# Patient Record
Sex: MALE | Race: WHITE
[De-identification: names, ages, dates, MRNs, and addresses within clinical notes are randomized per-mention and may not be internally consistent; named-entity substitution may affect disease eponyms.]

---

## 2019-12-19 ENCOUNTER — HOSPITAL ENCOUNTER (OUTPATIENT)
Dept: HOSPITAL 19 - SDCO | Age: 79
Discharge: HOME | End: 2019-12-19
Attending: FAMILY MEDICINE
Payer: MEDICARE

## 2019-12-19 VITALS — HEART RATE: 68 BPM | DIASTOLIC BLOOD PRESSURE: 90 MMHG | SYSTOLIC BLOOD PRESSURE: 148 MMHG | TEMPERATURE: 97.8 F

## 2019-12-19 VITALS — HEIGHT: 70.98 IN | BODY MASS INDEX: 23.67 KG/M2 | WEIGHT: 169.09 LBS

## 2019-12-19 VITALS — TEMPERATURE: 97.2 F | SYSTOLIC BLOOD PRESSURE: 131 MMHG | HEART RATE: 68 BPM | DIASTOLIC BLOOD PRESSURE: 75 MMHG

## 2019-12-19 VITALS — DIASTOLIC BLOOD PRESSURE: 88 MMHG | SYSTOLIC BLOOD PRESSURE: 124 MMHG | HEART RATE: 80 BPM

## 2019-12-19 DIAGNOSIS — A69.20: ICD-10-CM

## 2019-12-19 DIAGNOSIS — I10: ICD-10-CM

## 2019-12-19 DIAGNOSIS — Z86.010: ICD-10-CM

## 2019-12-19 DIAGNOSIS — Z87.891: ICD-10-CM

## 2019-12-19 DIAGNOSIS — Z90.89: ICD-10-CM

## 2019-12-19 DIAGNOSIS — Z85.118: ICD-10-CM

## 2019-12-19 DIAGNOSIS — Z12.11: Primary | ICD-10-CM

## 2019-12-19 NOTE — NUR
RECEIVED COFFEE AND DRANK 100%
RECEIVED DISCHARGE INSTRUCTIONS AND VERBALIZED UNDERSTANDING.
DISCONTINUED IV AND INT- CATHETER INTACT

## 2019-12-19 NOTE — NUR
TO BAY 4 PER OWN WILL STEADY GAIT. ALERT ORIENTED X3,TALKING WITH STAFF AND
SON. AMBULATED TO RECLINER WITH ASSIST AND TOLERATED WELL.
DR SPEARS TALKED WITH SON PRIOR TO PATIENT RETURNING TO .

## 2020-10-13 ENCOUNTER — HOSPITAL ENCOUNTER (INPATIENT)
Dept: HOSPITAL 19 - COL.ER | Age: 80
LOS: 7 days | Discharge: HOME HEALTH | DRG: 242 | End: 2020-10-20
Attending: STUDENT IN AN ORGANIZED HEALTH CARE EDUCATION/TRAINING PROGRAM | Admitting: STUDENT IN AN ORGANIZED HEALTH CARE EDUCATION/TRAINING PROGRAM
Payer: MEDICARE

## 2020-10-13 VITALS — OXYGEN SATURATION: 95 %

## 2020-10-13 VITALS — OXYGEN SATURATION: 91 %

## 2020-10-13 VITALS — OXYGEN SATURATION: 96 %

## 2020-10-13 VITALS — OXYGEN SATURATION: 94 %

## 2020-10-13 VITALS — SYSTOLIC BLOOD PRESSURE: 95 MMHG | TEMPERATURE: 100.3 F | DIASTOLIC BLOOD PRESSURE: 52 MMHG | HEART RATE: 42 BPM

## 2020-10-13 VITALS — OXYGEN SATURATION: 92 %

## 2020-10-13 VITALS — OXYGEN SATURATION: 97 %

## 2020-10-13 VITALS — OXYGEN SATURATION: 90 %

## 2020-10-13 VITALS — OXYGEN SATURATION: 89 %

## 2020-10-13 VITALS — OXYGEN SATURATION: 98 %

## 2020-10-13 VITALS — OXYGEN SATURATION: 93 %

## 2020-10-13 VITALS — HEIGHT: 70 IN | WEIGHT: 173.94 LBS | BODY MASS INDEX: 24.9 KG/M2

## 2020-10-13 VITALS — OXYGEN SATURATION: 86 %

## 2020-10-13 VITALS — OXYGEN SATURATION: 100 %

## 2020-10-13 VITALS — OXYGEN SATURATION: 88 %

## 2020-10-13 VITALS — OXYGEN SATURATION: 99 %

## 2020-10-13 VITALS
DIASTOLIC BLOOD PRESSURE: 53 MMHG | HEART RATE: 67 BPM | OXYGEN SATURATION: 97 % | SYSTOLIC BLOOD PRESSURE: 123 MMHG | TEMPERATURE: 100.3 F

## 2020-10-13 VITALS — HEART RATE: 84 BPM | OXYGEN SATURATION: 95 % | DIASTOLIC BLOOD PRESSURE: 61 MMHG | SYSTOLIC BLOOD PRESSURE: 99 MMHG

## 2020-10-13 VITALS — OXYGEN SATURATION: 82 %

## 2020-10-13 VITALS
DIASTOLIC BLOOD PRESSURE: 70 MMHG | TEMPERATURE: 98.7 F | HEART RATE: 51 BPM | OXYGEN SATURATION: 94 % | SYSTOLIC BLOOD PRESSURE: 102 MMHG

## 2020-10-13 VITALS — OXYGEN SATURATION: 87 %

## 2020-10-13 VITALS — OXYGEN SATURATION: 78 %

## 2020-10-13 VITALS — OXYGEN SATURATION: 84 %

## 2020-10-13 DIAGNOSIS — I44.2: Primary | ICD-10-CM

## 2020-10-13 DIAGNOSIS — I21.4: ICD-10-CM

## 2020-10-13 DIAGNOSIS — J96.01: ICD-10-CM

## 2020-10-13 DIAGNOSIS — Z20.828: ICD-10-CM

## 2020-10-13 DIAGNOSIS — I25.10: ICD-10-CM

## 2020-10-13 DIAGNOSIS — Z87.891: ICD-10-CM

## 2020-10-13 DIAGNOSIS — I48.91: ICD-10-CM

## 2020-10-13 DIAGNOSIS — E78.5: ICD-10-CM

## 2020-10-13 DIAGNOSIS — N17.9: ICD-10-CM

## 2020-10-13 DIAGNOSIS — I95.9: ICD-10-CM

## 2020-10-13 DIAGNOSIS — I10: ICD-10-CM

## 2020-10-13 DIAGNOSIS — J18.9: ICD-10-CM

## 2020-10-13 DIAGNOSIS — F05: ICD-10-CM

## 2020-10-13 DIAGNOSIS — D69.6: ICD-10-CM

## 2020-10-13 LAB
ALBUMIN SERPL-MCNC: 4.4 GM/DL (ref 3.5–5)
ALP SERPL-CCNC: 87 U/L (ref 50–136)
ALT SERPL-CCNC: 62 U/L (ref 4–49)
ANION GAP SERPL CALC-SCNC: 11 MMOL/L (ref 7–16)
APTT PPP: 27.7 SECONDS (ref 26–37)
AST SERPL-CCNC: 57 U/L (ref 15–37)
BASOPHILS # BLD: 0 10*3/UL (ref 0–0.2)
BASOPHILS NFR BLD AUTO: 0.2 % (ref 0–2)
BILIRUB SERPL-MCNC: 0.8 MG/DL (ref 0–1)
BUN SERPL-MCNC: 47 MG/DL (ref 9–20)
CALCIUM SERPL-MCNC: 9.3 MG/DL (ref 8.4–10.2)
CHLORIDE SERPL-SCNC: 103 MMOL/L (ref 98–107)
CO2 SERPL-SCNC: 23 MMOL/L (ref 22–30)
CREAT SERPL-SCNC: 1.41 UMOL/L (ref 0.66–1.25)
EOSINOPHIL # BLD: 0.1 10*3/UL (ref 0–0.7)
EOSINOPHIL NFR BLD: 0.9 % (ref 0–4)
ERYTHROCYTE [DISTWIDTH] IN BLOOD BY AUTOMATED COUNT: 12.6 % (ref 11.5–14.5)
GLUCOSE SERPL-MCNC: 102 MG/DL (ref 74–106)
GRANULOCYTES # BLD AUTO: 73.9 % (ref 42.2–75.2)
HCT VFR BLD AUTO: 41.4 % (ref 42–52)
HGB BLD-MCNC: 14 G/DL (ref 13.5–18)
INR BLD: 1 (ref 0.8–3)
LYMPHOCYTES # BLD: 1.9 10*3/UL (ref 1.2–3.4)
LYMPHOCYTES NFR BLD: 13.7 % (ref 20–51)
MAGNESIUM SERPL-MCNC: 2.4 MG/DL (ref 1.6–2.3)
MCH RBC QN AUTO: 31 PG (ref 27–31)
MCHC RBC AUTO-ENTMCNC: 34 G/DL (ref 33–37)
MCV RBC AUTO: 93 FL (ref 80–100)
MONOCYTES # BLD: 1.4 10*3/UL (ref 0.1–0.6)
MONOCYTES NFR BLD AUTO: 10.7 % (ref 1.7–9.3)
NEUTROPHILS # BLD: 9.9 10*3/UL (ref 1.4–6.5)
PLATELET # BLD AUTO: 198 K/MM3 (ref 130–400)
PMV BLD AUTO: 11 FL (ref 7.4–10.4)
POTASSIUM SERPL-SCNC: 4.9 MMOL/L (ref 3.4–5)
PROT SERPL-MCNC: 7.3 GM/DL (ref 6.4–8.2)
PROTHROMBIN TIME: 10.9 SECONDS (ref 9.7–12.8)
RBC # BLD AUTO: 4.46 M/MM3 (ref 4.2–5.6)
SODIUM SERPL-SCNC: 137 MMOL/L (ref 137–145)
TROPONIN I SERPL-MCNC: 0.23 NG/ML (ref 0–0.04)

## 2020-10-13 PROCEDURE — C9600 PERC DRUG-EL COR STENT SING: HCPCS

## 2020-10-13 PROCEDURE — 5A1223Z PERFORMANCE OF CARDIAC PACING, CONTINUOUS: ICD-10-PCS | Performed by: INTERNAL MEDICINE

## 2020-10-13 PROCEDURE — G0378 HOSPITAL OBSERVATION PER HR: HCPCS

## 2020-10-14 VITALS — SYSTOLIC BLOOD PRESSURE: 109 MMHG | HEART RATE: 37 BPM | DIASTOLIC BLOOD PRESSURE: 69 MMHG

## 2020-10-14 VITALS — OXYGEN SATURATION: 93 %

## 2020-10-14 VITALS — OXYGEN SATURATION: 96 %

## 2020-10-14 VITALS — OXYGEN SATURATION: 91 %

## 2020-10-14 VITALS — HEART RATE: 73 BPM | SYSTOLIC BLOOD PRESSURE: 110 MMHG | OXYGEN SATURATION: 91 % | DIASTOLIC BLOOD PRESSURE: 90 MMHG

## 2020-10-14 VITALS — OXYGEN SATURATION: 85 %

## 2020-10-14 VITALS — OXYGEN SATURATION: 83 %

## 2020-10-14 VITALS — OXYGEN SATURATION: 90 %

## 2020-10-14 VITALS — OXYGEN SATURATION: 92 %

## 2020-10-14 VITALS — OXYGEN SATURATION: 88 %

## 2020-10-14 VITALS — OXYGEN SATURATION: 92 % | HEART RATE: 77 BPM | DIASTOLIC BLOOD PRESSURE: 66 MMHG | SYSTOLIC BLOOD PRESSURE: 147 MMHG

## 2020-10-14 VITALS — OXYGEN SATURATION: 89 %

## 2020-10-14 VITALS — OXYGEN SATURATION: 95 %

## 2020-10-14 VITALS — DIASTOLIC BLOOD PRESSURE: 68 MMHG | SYSTOLIC BLOOD PRESSURE: 138 MMHG | HEART RATE: 75 BPM

## 2020-10-14 VITALS — OXYGEN SATURATION: 93 % | DIASTOLIC BLOOD PRESSURE: 55 MMHG | SYSTOLIC BLOOD PRESSURE: 125 MMHG | HEART RATE: 71 BPM

## 2020-10-14 VITALS — OXYGEN SATURATION: 94 %

## 2020-10-14 VITALS — DIASTOLIC BLOOD PRESSURE: 57 MMHG | SYSTOLIC BLOOD PRESSURE: 133 MMHG | HEART RATE: 50 BPM

## 2020-10-14 VITALS — OXYGEN SATURATION: 87 %

## 2020-10-14 VITALS
OXYGEN SATURATION: 93 % | HEART RATE: 40 BPM | DIASTOLIC BLOOD PRESSURE: 56 MMHG | SYSTOLIC BLOOD PRESSURE: 141 MMHG | TEMPERATURE: 97.8 F

## 2020-10-14 VITALS
OXYGEN SATURATION: 93 % | DIASTOLIC BLOOD PRESSURE: 61 MMHG | TEMPERATURE: 97.9 F | SYSTOLIC BLOOD PRESSURE: 121 MMHG | HEART RATE: 69 BPM

## 2020-10-14 VITALS — OXYGEN SATURATION: 98 %

## 2020-10-14 VITALS — OXYGEN SATURATION: 97 %

## 2020-10-14 VITALS — OXYGEN SATURATION: 86 %

## 2020-10-14 VITALS — OXYGEN SATURATION: 94 % | SYSTOLIC BLOOD PRESSURE: 145 MMHG | HEART RATE: 62 BPM | DIASTOLIC BLOOD PRESSURE: 59 MMHG

## 2020-10-14 VITALS — SYSTOLIC BLOOD PRESSURE: 107 MMHG | TEMPERATURE: 98 F | DIASTOLIC BLOOD PRESSURE: 50 MMHG | HEART RATE: 38 BPM

## 2020-10-14 VITALS — HEART RATE: 79 BPM | DIASTOLIC BLOOD PRESSURE: 62 MMHG | OXYGEN SATURATION: 98 % | SYSTOLIC BLOOD PRESSURE: 119 MMHG

## 2020-10-14 VITALS
HEART RATE: 37 BPM | TEMPERATURE: 98.2 F | OXYGEN SATURATION: 91 % | DIASTOLIC BLOOD PRESSURE: 54 MMHG | SYSTOLIC BLOOD PRESSURE: 105 MMHG

## 2020-10-14 VITALS — OXYGEN SATURATION: 93 % | SYSTOLIC BLOOD PRESSURE: 135 MMHG | DIASTOLIC BLOOD PRESSURE: 52 MMHG | HEART RATE: 52 BPM

## 2020-10-14 VITALS
DIASTOLIC BLOOD PRESSURE: 81 MMHG | SYSTOLIC BLOOD PRESSURE: 143 MMHG | OXYGEN SATURATION: 95 % | TEMPERATURE: 98.3 F | HEART RATE: 70 BPM

## 2020-10-14 VITALS — HEART RATE: 40 BPM | DIASTOLIC BLOOD PRESSURE: 56 MMHG | SYSTOLIC BLOOD PRESSURE: 141 MMHG

## 2020-10-14 VITALS — OXYGEN SATURATION: 84 %

## 2020-10-14 VITALS — HEART RATE: 64 BPM | OXYGEN SATURATION: 97 % | SYSTOLIC BLOOD PRESSURE: 136 MMHG | DIASTOLIC BLOOD PRESSURE: 59 MMHG

## 2020-10-14 VITALS — OXYGEN SATURATION: 97 % | SYSTOLIC BLOOD PRESSURE: 119 MMHG | HEART RATE: 69 BPM | DIASTOLIC BLOOD PRESSURE: 56 MMHG

## 2020-10-14 VITALS — SYSTOLIC BLOOD PRESSURE: 145 MMHG | OXYGEN SATURATION: 95 % | HEART RATE: 49 BPM | DIASTOLIC BLOOD PRESSURE: 66 MMHG

## 2020-10-14 VITALS — SYSTOLIC BLOOD PRESSURE: 121 MMHG | HEART RATE: 69 BPM | DIASTOLIC BLOOD PRESSURE: 61 MMHG

## 2020-10-14 VITALS — OXYGEN SATURATION: 82 %

## 2020-10-14 VITALS — OXYGEN SATURATION: 99 %

## 2020-10-14 VITALS — OXYGEN SATURATION: 65 %

## 2020-10-14 VITALS — OXYGEN SATURATION: 79 %

## 2020-10-14 VITALS — OXYGEN SATURATION: 59 %

## 2020-10-14 VITALS — OXYGEN SATURATION: 78 %

## 2020-10-14 LAB
ALBUMIN SERPL-MCNC: 3.7 GM/DL (ref 3.5–5)
ALP SERPL-CCNC: 73 U/L (ref 50–136)
ALT SERPL-CCNC: 38 U/L (ref 4–49)
ANION GAP SERPL CALC-SCNC: 10 MMOL/L (ref 7–16)
AST SERPL-CCNC: 30 U/L (ref 15–37)
BILIRUB SERPL-MCNC: 0.9 MG/DL (ref 0–1)
BUN SERPL-MCNC: 40 MG/DL (ref 9–20)
CALCIUM SERPL-MCNC: 8 MG/DL (ref 8.4–10.2)
CHLORIDE SERPL-SCNC: 107 MMOL/L (ref 98–107)
CHOLEST SPEC-SCNC: 128 MG/DL (ref 120–200)
CHOLEST/HDLC SERPL-SRTO: 5.1
CO2 SERPL-SCNC: 19 MMOL/L (ref 22–30)
CREAT SERPL-SCNC: 1.34 UMOL/L (ref 0.66–1.25)
ERYTHROCYTE [DISTWIDTH] IN BLOOD BY AUTOMATED COUNT: 13 % (ref 11.5–14.5)
GLUCOSE SERPL-MCNC: 106 MG/DL (ref 74–106)
HCT VFR BLD AUTO: 40.5 % (ref 42–52)
HDLC SERPL-MCNC: 25 MG/DL
HGB BLD-MCNC: 13.5 G/DL (ref 13.5–18)
LDLC SERPL-MCNC: 75 MG/DL
LYMPHOCYTES NFR BLD MANUAL: 19 % (ref 20–51)
MCH RBC QN AUTO: 32 PG (ref 27–31)
MCHC RBC AUTO-ENTMCNC: 33 G/DL (ref 33–37)
MCV RBC AUTO: 94 FL (ref 80–100)
MONOCYTES NFR BLD: 11 % (ref 1.7–9.3)
NEUTS BAND NFR BLD: 2 % (ref 0–10)
NEUTS SEG NFR BLD MANUAL: 68 % (ref 42–75.2)
PLATELET # BLD AUTO: 145 K/MM3 (ref 130–400)
PLATELET BLD QL SMEAR: NORMAL
PMV BLD AUTO: 10.9 FL (ref 7.4–10.4)
POTASSIUM SERPL-SCNC: 4.6 MMOL/L (ref 3.4–5)
PROT SERPL-MCNC: 6.3 GM/DL (ref 6.4–8.2)
RBC # BLD AUTO: 4.29 M/MM3 (ref 4.2–5.6)
SODIUM SERPL-SCNC: 136 MMOL/L (ref 137–145)
TRIGL SERPL-MCNC: 138 MG/DL

## 2020-10-14 PROCEDURE — 4A023N7 MEASUREMENT OF CARDIAC SAMPLING AND PRESSURE, LEFT HEART, PERCUTANEOUS APPROACH: ICD-10-PCS | Performed by: INTERNAL MEDICINE

## 2020-10-14 PROCEDURE — B2111ZZ FLUOROSCOPY OF MULTIPLE CORONARY ARTERIES USING LOW OSMOLAR CONTRAST: ICD-10-PCS | Performed by: INTERNAL MEDICINE

## 2020-10-14 PROCEDURE — 027034Z DILATION OF CORONARY ARTERY, ONE ARTERY WITH DRUG-ELUTING INTRALUMINAL DEVICE, PERCUTANEOUS APPROACH: ICD-10-PCS | Performed by: INTERNAL MEDICINE

## 2020-10-15 VITALS — OXYGEN SATURATION: 89 %

## 2020-10-15 VITALS — OXYGEN SATURATION: 96 %

## 2020-10-15 VITALS — OXYGEN SATURATION: 95 %

## 2020-10-15 VITALS — OXYGEN SATURATION: 98 %

## 2020-10-15 VITALS — OXYGEN SATURATION: 97 %

## 2020-10-15 VITALS — OXYGEN SATURATION: 94 %

## 2020-10-15 VITALS — OXYGEN SATURATION: 93 %

## 2020-10-15 VITALS — OXYGEN SATURATION: 92 %

## 2020-10-15 VITALS — OXYGEN SATURATION: 88 %

## 2020-10-15 VITALS — OXYGEN SATURATION: 100 %

## 2020-10-15 VITALS — OXYGEN SATURATION: 91 %

## 2020-10-15 VITALS — OXYGEN SATURATION: 90 %

## 2020-10-15 VITALS — OXYGEN SATURATION: 99 %

## 2020-10-15 VITALS — HEART RATE: 46 BPM | SYSTOLIC BLOOD PRESSURE: 113 MMHG | DIASTOLIC BLOOD PRESSURE: 90 MMHG | TEMPERATURE: 98.1 F

## 2020-10-15 VITALS — OXYGEN SATURATION: 72 %

## 2020-10-15 VITALS — OXYGEN SATURATION: 79 %

## 2020-10-15 VITALS — OXYGEN SATURATION: 82 %

## 2020-10-15 VITALS
HEART RATE: 53 BPM | TEMPERATURE: 98.2 F | SYSTOLIC BLOOD PRESSURE: 122 MMHG | OXYGEN SATURATION: 96 % | DIASTOLIC BLOOD PRESSURE: 49 MMHG

## 2020-10-15 VITALS — OXYGEN SATURATION: 87 %

## 2020-10-15 VITALS — OXYGEN SATURATION: 85 %

## 2020-10-15 VITALS
DIASTOLIC BLOOD PRESSURE: 47 MMHG | SYSTOLIC BLOOD PRESSURE: 145 MMHG | OXYGEN SATURATION: 96 % | HEART RATE: 58 BPM | TEMPERATURE: 98 F

## 2020-10-15 VITALS — SYSTOLIC BLOOD PRESSURE: 122 MMHG | HEART RATE: 46 BPM | TEMPERATURE: 98.3 F | DIASTOLIC BLOOD PRESSURE: 71 MMHG

## 2020-10-15 VITALS — HEART RATE: 51 BPM | TEMPERATURE: 98.4 F | DIASTOLIC BLOOD PRESSURE: 62 MMHG | SYSTOLIC BLOOD PRESSURE: 130 MMHG

## 2020-10-15 VITALS — DIASTOLIC BLOOD PRESSURE: 49 MMHG | SYSTOLIC BLOOD PRESSURE: 121 MMHG | HEART RATE: 46 BPM | TEMPERATURE: 98.4 F

## 2020-10-15 VITALS — OXYGEN SATURATION: 86 %

## 2020-10-15 VITALS — OXYGEN SATURATION: 68 %

## 2020-10-15 VITALS — OXYGEN SATURATION: 71 %

## 2020-10-15 LAB
ANION GAP SERPL CALC-SCNC: 9 MMOL/L (ref 7–16)
BUN SERPL-MCNC: 30 MG/DL (ref 9–20)
CALCIUM SERPL-MCNC: 8 MG/DL (ref 8.4–10.2)
CHLORIDE SERPL-SCNC: 109 MMOL/L (ref 98–107)
CO2 SERPL-SCNC: 18 MMOL/L (ref 22–30)
CREAT SERPL-SCNC: 1.18 UMOL/L (ref 0.66–1.25)
ERYTHROCYTE [DISTWIDTH] IN BLOOD BY AUTOMATED COUNT: 12.7 % (ref 11.5–14.5)
GLUCOSE SERPL-MCNC: 124 MG/DL (ref 74–106)
HCT VFR BLD AUTO: 35.9 % (ref 42–52)
HGB BLD-MCNC: 12.6 G/DL (ref 13.5–18)
LYMPHOCYTES NFR BLD MANUAL: 9 % (ref 20–51)
MCH RBC QN AUTO: 32 PG (ref 27–31)
MCHC RBC AUTO-ENTMCNC: 35 G/DL (ref 33–37)
MCV RBC AUTO: 90 FL (ref 80–100)
MONOCYTES NFR BLD: 17 % (ref 1.7–9.3)
NEUTS BAND NFR BLD: 4 % (ref 0–10)
NEUTS SEG NFR BLD MANUAL: 70 % (ref 42–75.2)
PLATELET # BLD AUTO: 111 K/MM3 (ref 130–400)
PLATELET BLD QL SMEAR: (no result)
PMV BLD AUTO: 11 FL (ref 7.4–10.4)
POTASSIUM SERPL-SCNC: 4.1 MMOL/L (ref 3.4–5)
RBC # BLD AUTO: 4 M/MM3 (ref 4.2–5.6)
SODIUM SERPL-SCNC: 136 MMOL/L (ref 137–145)

## 2020-10-16 VITALS — OXYGEN SATURATION: 97 %

## 2020-10-16 VITALS — OXYGEN SATURATION: 95 %

## 2020-10-16 VITALS — OXYGEN SATURATION: 92 %

## 2020-10-16 VITALS — OXYGEN SATURATION: 96 %

## 2020-10-16 VITALS — OXYGEN SATURATION: 93 %

## 2020-10-16 VITALS — OXYGEN SATURATION: 98 %

## 2020-10-16 VITALS — OXYGEN SATURATION: 94 %

## 2020-10-16 VITALS — OXYGEN SATURATION: 99 %

## 2020-10-16 VITALS
HEART RATE: 48 BPM | OXYGEN SATURATION: 95 % | TEMPERATURE: 98 F | SYSTOLIC BLOOD PRESSURE: 134 MMHG | DIASTOLIC BLOOD PRESSURE: 49 MMHG

## 2020-10-16 VITALS — OXYGEN SATURATION: 87 %

## 2020-10-16 VITALS — OXYGEN SATURATION: 85 %

## 2020-10-16 VITALS
DIASTOLIC BLOOD PRESSURE: 66 MMHG | HEART RATE: 48 BPM | OXYGEN SATURATION: 96 % | SYSTOLIC BLOOD PRESSURE: 138 MMHG | TEMPERATURE: 97.8 F

## 2020-10-16 VITALS
HEART RATE: 49 BPM | SYSTOLIC BLOOD PRESSURE: 125 MMHG | OXYGEN SATURATION: 94 % | DIASTOLIC BLOOD PRESSURE: 52 MMHG | TEMPERATURE: 97.8 F

## 2020-10-16 VITALS — HEART RATE: 55 BPM | SYSTOLIC BLOOD PRESSURE: 144 MMHG | DIASTOLIC BLOOD PRESSURE: 84 MMHG

## 2020-10-16 VITALS — OXYGEN SATURATION: 89 %

## 2020-10-16 VITALS — OXYGEN SATURATION: 91 %

## 2020-10-16 VITALS — OXYGEN SATURATION: 100 %

## 2020-10-16 VITALS — OXYGEN SATURATION: 90 %

## 2020-10-16 VITALS
OXYGEN SATURATION: 94 % | TEMPERATURE: 98.1 F | SYSTOLIC BLOOD PRESSURE: 118 MMHG | DIASTOLIC BLOOD PRESSURE: 53 MMHG | HEART RATE: 61 BPM

## 2020-10-16 VITALS — DIASTOLIC BLOOD PRESSURE: 63 MMHG | HEART RATE: 56 BPM | SYSTOLIC BLOOD PRESSURE: 111 MMHG | OXYGEN SATURATION: 99 %

## 2020-10-16 VITALS — OXYGEN SATURATION: 86 %

## 2020-10-16 VITALS — OXYGEN SATURATION: 88 %

## 2020-10-16 VITALS
TEMPERATURE: 98 F | OXYGEN SATURATION: 97 % | HEART RATE: 55 BPM | SYSTOLIC BLOOD PRESSURE: 144 MMHG | DIASTOLIC BLOOD PRESSURE: 84 MMHG

## 2020-10-16 VITALS
OXYGEN SATURATION: 96 % | TEMPERATURE: 98 F | DIASTOLIC BLOOD PRESSURE: 61 MMHG | HEART RATE: 53 BPM | SYSTOLIC BLOOD PRESSURE: 128 MMHG

## 2020-10-16 VITALS
OXYGEN SATURATION: 97 % | SYSTOLIC BLOOD PRESSURE: 144 MMHG | DIASTOLIC BLOOD PRESSURE: 55 MMHG | HEART RATE: 52 BPM | TEMPERATURE: 99.4 F

## 2020-10-16 VITALS — HEART RATE: 55 BPM | SYSTOLIC BLOOD PRESSURE: 144 MMHG | OXYGEN SATURATION: 96 % | DIASTOLIC BLOOD PRESSURE: 84 MMHG

## 2020-10-16 VITALS
TEMPERATURE: 99.4 F | DIASTOLIC BLOOD PRESSURE: 55 MMHG | OXYGEN SATURATION: 95 % | SYSTOLIC BLOOD PRESSURE: 144 MMHG | HEART RATE: 70 BPM

## 2020-10-16 VITALS — OXYGEN SATURATION: 78 %

## 2020-10-16 LAB
ANION GAP SERPL CALC-SCNC: 7 MMOL/L (ref 7–16)
BASOPHILS # BLD: 0 10*3/UL (ref 0–0.2)
BASOPHILS NFR BLD AUTO: 0.2 % (ref 0–2)
BUN SERPL-MCNC: 32 MG/DL (ref 9–20)
CALCIUM SERPL-MCNC: 8 MG/DL (ref 8.4–10.2)
CHLORIDE SERPL-SCNC: 107 MMOL/L (ref 98–107)
CO2 SERPL-SCNC: 21 MMOL/L (ref 22–30)
CREAT SERPL-SCNC: 1.14 UMOL/L (ref 0.66–1.25)
EOSINOPHIL # BLD: 0 10*3/UL (ref 0–0.7)
EOSINOPHIL NFR BLD: 0.2 % (ref 0–4)
ERYTHROCYTE [DISTWIDTH] IN BLOOD BY AUTOMATED COUNT: 12.8 % (ref 11.5–14.5)
GLUCOSE SERPL-MCNC: 115 MG/DL (ref 74–106)
GRANULOCYTES # BLD AUTO: 81.2 % (ref 42.2–75.2)
HCT VFR BLD AUTO: 33.3 % (ref 42–52)
HGB BLD-MCNC: 11.4 G/DL (ref 13.5–18)
LYMPHOCYTES # BLD: 0.9 10*3/UL (ref 1.2–3.4)
LYMPHOCYTES NFR BLD: 7 % (ref 20–51)
MAGNESIUM SERPL-MCNC: 2.3 MG/DL (ref 1.6–2.3)
MCH RBC QN AUTO: 31 PG (ref 27–31)
MCHC RBC AUTO-ENTMCNC: 34 G/DL (ref 33–37)
MCV RBC AUTO: 91 FL (ref 80–100)
MONOCYTES # BLD: 1.3 10*3/UL (ref 0.1–0.6)
MONOCYTES NFR BLD AUTO: 10.8 % (ref 1.7–9.3)
NEUTROPHILS # BLD: 9.9 10*3/UL (ref 1.4–6.5)
PLATELET # BLD AUTO: 136 K/MM3 (ref 130–400)
PMV BLD AUTO: 11.3 FL (ref 7.4–10.4)
POTASSIUM SERPL-SCNC: 3.6 MMOL/L (ref 3.4–5)
RBC # BLD AUTO: 3.67 M/MM3 (ref 4.2–5.6)
SODIUM SERPL-SCNC: 135 MMOL/L (ref 137–145)

## 2020-10-16 PROCEDURE — 5A2204Z RESTORATION OF CARDIAC RHYTHM, SINGLE: ICD-10-PCS | Performed by: INTERNAL MEDICINE

## 2020-10-17 VITALS — OXYGEN SATURATION: 96 %

## 2020-10-17 VITALS
OXYGEN SATURATION: 95 % | SYSTOLIC BLOOD PRESSURE: 135 MMHG | TEMPERATURE: 98.2 F | DIASTOLIC BLOOD PRESSURE: 51 MMHG | HEART RATE: 49 BPM

## 2020-10-17 VITALS — OXYGEN SATURATION: 94 %

## 2020-10-17 VITALS — OXYGEN SATURATION: 76 %

## 2020-10-17 VITALS — OXYGEN SATURATION: 93 %

## 2020-10-17 VITALS — OXYGEN SATURATION: 97 %

## 2020-10-17 VITALS — OXYGEN SATURATION: 91 %

## 2020-10-17 VITALS — OXYGEN SATURATION: 95 %

## 2020-10-17 VITALS — OXYGEN SATURATION: 99 %

## 2020-10-17 VITALS — OXYGEN SATURATION: 92 %

## 2020-10-17 VITALS — OXYGEN SATURATION: 83 %

## 2020-10-17 VITALS — OXYGEN SATURATION: 98 %

## 2020-10-17 VITALS — OXYGEN SATURATION: 78 %

## 2020-10-17 VITALS
SYSTOLIC BLOOD PRESSURE: 118 MMHG | TEMPERATURE: 98.1 F | DIASTOLIC BLOOD PRESSURE: 81 MMHG | OXYGEN SATURATION: 84 % | HEART RATE: 62 BPM

## 2020-10-17 VITALS — OXYGEN SATURATION: 80 %

## 2020-10-17 VITALS — OXYGEN SATURATION: 88 %

## 2020-10-17 VITALS — OXYGEN SATURATION: 82 %

## 2020-10-17 VITALS — HEART RATE: 54 BPM | SYSTOLIC BLOOD PRESSURE: 126 MMHG | TEMPERATURE: 98.4 F | DIASTOLIC BLOOD PRESSURE: 75 MMHG

## 2020-10-17 VITALS — OXYGEN SATURATION: 81 %

## 2020-10-17 VITALS — OXYGEN SATURATION: 90 %

## 2020-10-17 VITALS — TEMPERATURE: 98.3 F | SYSTOLIC BLOOD PRESSURE: 140 MMHG | DIASTOLIC BLOOD PRESSURE: 74 MMHG | HEART RATE: 59 BPM

## 2020-10-17 VITALS — OXYGEN SATURATION: 87 %

## 2020-10-17 VITALS — OXYGEN SATURATION: 100 %

## 2020-10-17 VITALS
HEART RATE: 48 BPM | DIASTOLIC BLOOD PRESSURE: 90 MMHG | TEMPERATURE: 99.4 F | OXYGEN SATURATION: 94 % | SYSTOLIC BLOOD PRESSURE: 126 MMHG

## 2020-10-17 VITALS — OXYGEN SATURATION: 85 %

## 2020-10-17 VITALS
TEMPERATURE: 98.2 F | SYSTOLIC BLOOD PRESSURE: 119 MMHG | DIASTOLIC BLOOD PRESSURE: 79 MMHG | OXYGEN SATURATION: 80 % | HEART RATE: 60 BPM

## 2020-10-17 VITALS — OXYGEN SATURATION: 89 %

## 2020-10-17 VITALS — OXYGEN SATURATION: 84 %

## 2020-10-17 VITALS — OXYGEN SATURATION: 86 %

## 2020-10-17 VITALS — OXYGEN SATURATION: 72 %

## 2020-10-17 VITALS — OXYGEN SATURATION: 79 %

## 2020-10-17 VITALS — OXYGEN SATURATION: 77 %

## 2020-10-17 VITALS — OXYGEN SATURATION: 70 %

## 2020-10-17 LAB
ANION GAP SERPL CALC-SCNC: 8 MMOL/L (ref 7–16)
BASOPHILS # BLD: 0 10*3/UL (ref 0–0.2)
BASOPHILS NFR BLD AUTO: 0.2 % (ref 0–2)
BUN SERPL-MCNC: 29 MG/DL (ref 9–20)
CALCIUM SERPL-MCNC: 7.9 MG/DL (ref 8.4–10.2)
CHLORIDE SERPL-SCNC: 104 MMOL/L (ref 98–107)
CO2 SERPL-SCNC: 23 MMOL/L (ref 22–30)
CREAT SERPL-SCNC: 1.1 UMOL/L (ref 0.66–1.25)
EOSINOPHIL # BLD: 0.1 10*3/UL (ref 0–0.7)
EOSINOPHIL NFR BLD: 1.3 % (ref 0–4)
ERYTHROCYTE [DISTWIDTH] IN BLOOD BY AUTOMATED COUNT: 12.8 % (ref 11.5–14.5)
GLUCOSE SERPL-MCNC: 108 MG/DL (ref 74–106)
GRANULOCYTES # BLD AUTO: 78.8 % (ref 42.2–75.2)
HCT VFR BLD AUTO: 34.5 % (ref 42–52)
HGB BLD-MCNC: 11.9 G/DL (ref 13.5–18)
LYMPHOCYTES # BLD: 0.9 10*3/UL (ref 1.2–3.4)
LYMPHOCYTES NFR BLD: 9.5 % (ref 20–51)
MCH RBC QN AUTO: 31 PG (ref 27–31)
MCHC RBC AUTO-ENTMCNC: 35 G/DL (ref 33–37)
MCV RBC AUTO: 91 FL (ref 80–100)
MONOCYTES # BLD: 0.9 10*3/UL (ref 0.1–0.6)
MONOCYTES NFR BLD AUTO: 9.6 % (ref 1.7–9.3)
NEUTROPHILS # BLD: 7.7 10*3/UL (ref 1.4–6.5)
PLATELET # BLD AUTO: 153 K/MM3 (ref 130–400)
PMV BLD AUTO: 10.8 FL (ref 7.4–10.4)
POTASSIUM SERPL-SCNC: 3.9 MMOL/L (ref 3.4–5)
RBC # BLD AUTO: 3.8 M/MM3 (ref 4.2–5.6)
SODIUM SERPL-SCNC: 135 MMOL/L (ref 137–145)

## 2020-10-18 VITALS — OXYGEN SATURATION: 97 %

## 2020-10-18 VITALS — OXYGEN SATURATION: 95 %

## 2020-10-18 VITALS — OXYGEN SATURATION: 94 %

## 2020-10-18 VITALS — OXYGEN SATURATION: 93 %

## 2020-10-18 VITALS — OXYGEN SATURATION: 92 %

## 2020-10-18 VITALS — OXYGEN SATURATION: 91 %

## 2020-10-18 VITALS — HEART RATE: 53 BPM | TEMPERATURE: 98 F | SYSTOLIC BLOOD PRESSURE: 147 MMHG | DIASTOLIC BLOOD PRESSURE: 60 MMHG

## 2020-10-18 VITALS — OXYGEN SATURATION: 96 %

## 2020-10-18 VITALS — OXYGEN SATURATION: 90 %

## 2020-10-18 VITALS
HEART RATE: 98 BPM | SYSTOLIC BLOOD PRESSURE: 110 MMHG | TEMPERATURE: 98.2 F | DIASTOLIC BLOOD PRESSURE: 49 MMHG | OXYGEN SATURATION: 94 %

## 2020-10-18 VITALS — HEART RATE: 64 BPM | SYSTOLIC BLOOD PRESSURE: 110 MMHG | DIASTOLIC BLOOD PRESSURE: 41 MMHG | TEMPERATURE: 98.5 F

## 2020-10-18 VITALS — OXYGEN SATURATION: 84 %

## 2020-10-18 VITALS — OXYGEN SATURATION: 98 %

## 2020-10-18 VITALS
TEMPERATURE: 98 F | HEART RATE: 73 BPM | DIASTOLIC BLOOD PRESSURE: 86 MMHG | OXYGEN SATURATION: 95 % | SYSTOLIC BLOOD PRESSURE: 141 MMHG

## 2020-10-18 VITALS — OXYGEN SATURATION: 89 %

## 2020-10-18 VITALS — DIASTOLIC BLOOD PRESSURE: 47 MMHG | SYSTOLIC BLOOD PRESSURE: 119 MMHG | TEMPERATURE: 98.2 F | HEART RATE: 40 BPM

## 2020-10-18 VITALS
TEMPERATURE: 98.1 F | OXYGEN SATURATION: 93 % | DIASTOLIC BLOOD PRESSURE: 77 MMHG | HEART RATE: 43 BPM | SYSTOLIC BLOOD PRESSURE: 116 MMHG

## 2020-10-18 VITALS — OXYGEN SATURATION: 83 %

## 2020-10-18 VITALS — OXYGEN SATURATION: 87 %

## 2020-10-18 VITALS — OXYGEN SATURATION: 88 %

## 2020-10-18 VITALS — OXYGEN SATURATION: 74 %

## 2020-10-18 VITALS — OXYGEN SATURATION: 82 %

## 2020-10-18 VITALS — OXYGEN SATURATION: 86 %

## 2020-10-18 LAB
ANION GAP SERPL CALC-SCNC: 7 MMOL/L (ref 7–16)
ANION GAP SERPL CALC-SCNC: 8 MMOL/L (ref 7–16)
APTT PPP: 27.5 SECONDS (ref 26–37)
BASOPHILS # BLD: 0 10*3/UL (ref 0–0.2)
BASOPHILS NFR BLD AUTO: 0.3 % (ref 0–2)
BUN SERPL-MCNC: 29 MG/DL (ref 9–20)
BUN SERPL-MCNC: 31 MG/DL (ref 9–20)
CALCIUM SERPL-MCNC: 7.9 MG/DL (ref 8.4–10.2)
CALCIUM SERPL-MCNC: 8.5 MG/DL (ref 8.4–10.2)
CHLORIDE SERPL-SCNC: 101 MMOL/L (ref 98–107)
CHLORIDE SERPL-SCNC: 104 MMOL/L (ref 98–107)
CO2 SERPL-SCNC: 22 MMOL/L (ref 22–30)
CO2 SERPL-SCNC: 27 MMOL/L (ref 22–30)
CREAT SERPL-SCNC: 0.96 UMOL/L (ref 0.66–1.25)
CREAT SERPL-SCNC: 1.06 UMOL/L (ref 0.66–1.25)
EOSINOPHIL # BLD: 0.2 10*3/UL (ref 0–0.7)
EOSINOPHIL NFR BLD: 2.1 % (ref 0–4)
ERYTHROCYTE [DISTWIDTH] IN BLOOD BY AUTOMATED COUNT: 12.4 % (ref 11.5–14.5)
ERYTHROCYTE [DISTWIDTH] IN BLOOD BY AUTOMATED COUNT: 12.4 % (ref 11.5–14.5)
GLUCOSE SERPL-MCNC: 104 MG/DL (ref 74–106)
GLUCOSE SERPL-MCNC: 137 MG/DL (ref 74–106)
GRANULOCYTES # BLD AUTO: 79.4 % (ref 42.2–75.2)
HCT VFR BLD AUTO: 33.6 % (ref 42–52)
HCT VFR BLD AUTO: 36.1 % (ref 42–52)
HGB BLD-MCNC: 11.5 G/DL (ref 13.5–18)
HGB BLD-MCNC: 12.1 G/DL (ref 13.5–18)
INR BLD: 1.1 (ref 0.8–3)
LYMPHOCYTES # BLD: 0.8 10*3/UL (ref 1.2–3.4)
LYMPHOCYTES NFR BLD: 8.2 % (ref 20–51)
MCH RBC QN AUTO: 31 PG (ref 27–31)
MCH RBC QN AUTO: 31 PG (ref 27–31)
MCHC RBC AUTO-ENTMCNC: 34 G/DL (ref 33–37)
MCHC RBC AUTO-ENTMCNC: 34 G/DL (ref 33–37)
MCV RBC AUTO: 91 FL (ref 80–100)
MCV RBC AUTO: 91 FL (ref 80–100)
MONOCYTES # BLD: 0.9 10*3/UL (ref 0.1–0.6)
MONOCYTES NFR BLD AUTO: 9.6 % (ref 1.7–9.3)
NEUTROPHILS # BLD: 7.5 10*3/UL (ref 1.4–6.5)
PLATELET # BLD AUTO: 180 K/MM3 (ref 130–400)
PLATELET # BLD AUTO: 213 K/MM3 (ref 130–400)
PMV BLD AUTO: 10.2 FL (ref 7.4–10.4)
PMV BLD AUTO: 10.5 FL (ref 7.4–10.4)
POTASSIUM SERPL-SCNC: 3.4 MMOL/L (ref 3.4–5)
POTASSIUM SERPL-SCNC: 4 MMOL/L (ref 3.4–5)
PROTHROMBIN TIME: 12.6 SECONDS (ref 9.7–12.8)
RBC # BLD AUTO: 3.71 M/MM3 (ref 4.2–5.6)
RBC # BLD AUTO: 3.96 M/MM3 (ref 4.2–5.6)
SODIUM SERPL-SCNC: 135 MMOL/L (ref 137–145)
SODIUM SERPL-SCNC: 135 MMOL/L (ref 137–145)

## 2020-10-19 VITALS
HEART RATE: 58 BPM | SYSTOLIC BLOOD PRESSURE: 108 MMHG | OXYGEN SATURATION: 94 % | TEMPERATURE: 98.3 F | DIASTOLIC BLOOD PRESSURE: 45 MMHG

## 2020-10-19 VITALS — OXYGEN SATURATION: 97 %

## 2020-10-19 VITALS — OXYGEN SATURATION: 93 %

## 2020-10-19 VITALS — OXYGEN SATURATION: 95 %

## 2020-10-19 VITALS — OXYGEN SATURATION: 94 %

## 2020-10-19 VITALS
HEART RATE: 48 BPM | DIASTOLIC BLOOD PRESSURE: 55 MMHG | OXYGEN SATURATION: 97 % | TEMPERATURE: 98 F | SYSTOLIC BLOOD PRESSURE: 130 MMHG

## 2020-10-19 VITALS — OXYGEN SATURATION: 96 %

## 2020-10-19 VITALS
OXYGEN SATURATION: 93 % | HEART RATE: 42 BPM | SYSTOLIC BLOOD PRESSURE: 138 MMHG | DIASTOLIC BLOOD PRESSURE: 99 MMHG | TEMPERATURE: 98.3 F

## 2020-10-19 VITALS — OXYGEN SATURATION: 92 %

## 2020-10-19 VITALS — OXYGEN SATURATION: 98 %

## 2020-10-19 VITALS — OXYGEN SATURATION: 91 %

## 2020-10-19 VITALS — OXYGEN SATURATION: 99 %

## 2020-10-19 VITALS — OXYGEN SATURATION: 86 %

## 2020-10-19 VITALS — SYSTOLIC BLOOD PRESSURE: 116 MMHG | OXYGEN SATURATION: 96 % | DIASTOLIC BLOOD PRESSURE: 100 MMHG | HEART RATE: 42 BPM

## 2020-10-19 VITALS — OXYGEN SATURATION: 82 %

## 2020-10-19 VITALS — OXYGEN SATURATION: 89 %

## 2020-10-19 VITALS
DIASTOLIC BLOOD PRESSURE: 51 MMHG | OXYGEN SATURATION: 95 % | SYSTOLIC BLOOD PRESSURE: 135 MMHG | HEART RATE: 59 BPM | TEMPERATURE: 98.2 F

## 2020-10-19 VITALS — OXYGEN SATURATION: 90 %

## 2020-10-19 VITALS — SYSTOLIC BLOOD PRESSURE: 131 MMHG | DIASTOLIC BLOOD PRESSURE: 40 MMHG | HEART RATE: 45 BPM

## 2020-10-19 VITALS
DIASTOLIC BLOOD PRESSURE: 45 MMHG | SYSTOLIC BLOOD PRESSURE: 146 MMHG | TEMPERATURE: 97.6 F | HEART RATE: 61 BPM | OXYGEN SATURATION: 97 %

## 2020-10-19 VITALS — SYSTOLIC BLOOD PRESSURE: 108 MMHG | TEMPERATURE: 98.2 F | HEART RATE: 38 BPM | DIASTOLIC BLOOD PRESSURE: 56 MMHG

## 2020-10-19 VITALS
SYSTOLIC BLOOD PRESSURE: 127 MMHG | TEMPERATURE: 98.4 F | DIASTOLIC BLOOD PRESSURE: 60 MMHG | HEART RATE: 51 BPM | OXYGEN SATURATION: 95 %

## 2020-10-19 VITALS — OXYGEN SATURATION: 100 %

## 2020-10-19 VITALS — SYSTOLIC BLOOD PRESSURE: 130 MMHG | OXYGEN SATURATION: 98 % | HEART RATE: 54 BPM | DIASTOLIC BLOOD PRESSURE: 50 MMHG

## 2020-10-19 VITALS — HEART RATE: 53 BPM | SYSTOLIC BLOOD PRESSURE: 95 MMHG | DIASTOLIC BLOOD PRESSURE: 75 MMHG

## 2020-10-19 VITALS — OXYGEN SATURATION: 96 % | SYSTOLIC BLOOD PRESSURE: 134 MMHG | HEART RATE: 53 BPM | DIASTOLIC BLOOD PRESSURE: 55 MMHG

## 2020-10-19 VITALS — OXYGEN SATURATION: 87 %

## 2020-10-19 VITALS — OXYGEN SATURATION: 79 %

## 2020-10-19 VITALS — OXYGEN SATURATION: 80 %

## 2020-10-19 VITALS — OXYGEN SATURATION: 88 %

## 2020-10-19 VITALS — HEART RATE: 60 BPM | DIASTOLIC BLOOD PRESSURE: 48 MMHG | SYSTOLIC BLOOD PRESSURE: 120 MMHG

## 2020-10-19 VITALS — HEART RATE: 48 BPM | SYSTOLIC BLOOD PRESSURE: 130 MMHG | TEMPERATURE: 98 F | DIASTOLIC BLOOD PRESSURE: 55 MMHG

## 2020-10-19 VITALS — OXYGEN SATURATION: 92 % | DIASTOLIC BLOOD PRESSURE: 79 MMHG | HEART RATE: 54 BPM | SYSTOLIC BLOOD PRESSURE: 105 MMHG

## 2020-10-19 VITALS — OXYGEN SATURATION: 83 %

## 2020-10-19 VITALS — OXYGEN SATURATION: 76 %

## 2020-10-19 VITALS — OXYGEN SATURATION: 78 %

## 2020-10-19 VITALS — OXYGEN SATURATION: 85 %

## 2020-10-19 VITALS — OXYGEN SATURATION: 81 %

## 2020-10-19 LAB
ANION GAP SERPL CALC-SCNC: 9 MMOL/L (ref 7–16)
BASOPHILS # BLD: 0 10*3/UL (ref 0–0.2)
BASOPHILS NFR BLD AUTO: 0.2 % (ref 0–2)
BUN SERPL-MCNC: 32 MG/DL (ref 9–20)
CALCIUM SERPL-MCNC: 8.2 MG/DL (ref 8.4–10.2)
CHLORIDE SERPL-SCNC: 104 MMOL/L (ref 98–107)
CO2 SERPL-SCNC: 22 MMOL/L (ref 22–30)
CREAT SERPL-SCNC: 1.01 UMOL/L (ref 0.66–1.25)
EOSINOPHIL # BLD: 0.2 10*3/UL (ref 0–0.7)
EOSINOPHIL NFR BLD: 2.6 % (ref 0–4)
ERYTHROCYTE [DISTWIDTH] IN BLOOD BY AUTOMATED COUNT: 12.5 % (ref 11.5–14.5)
GLUCOSE SERPL-MCNC: 99 MG/DL (ref 74–106)
GRANULOCYTES # BLD AUTO: 76.1 % (ref 42.2–75.2)
HCT VFR BLD AUTO: 32.6 % (ref 42–52)
HGB BLD-MCNC: 11.2 G/DL (ref 13.5–18)
LYMPHOCYTES # BLD: 0.9 10*3/UL (ref 1.2–3.4)
LYMPHOCYTES NFR BLD: 9.6 % (ref 20–51)
MCH RBC QN AUTO: 32 PG (ref 27–31)
MCHC RBC AUTO-ENTMCNC: 34 G/DL (ref 33–37)
MCV RBC AUTO: 93 FL (ref 80–100)
MONOCYTES # BLD: 1 10*3/UL (ref 0.1–0.6)
MONOCYTES NFR BLD AUTO: 11 % (ref 1.7–9.3)
NEUTROPHILS # BLD: 7 10*3/UL (ref 1.4–6.5)
PLATELET # BLD AUTO: 196 K/MM3 (ref 130–400)
PMV BLD AUTO: 10.5 FL (ref 7.4–10.4)
POTASSIUM SERPL-SCNC: 4.1 MMOL/L (ref 3.4–5)
RBC # BLD AUTO: 3.52 M/MM3 (ref 4.2–5.6)
SODIUM SERPL-SCNC: 135 MMOL/L (ref 137–145)

## 2020-10-20 VITALS — OXYGEN SATURATION: 94 %

## 2020-10-20 VITALS — HEART RATE: 73 BPM | SYSTOLIC BLOOD PRESSURE: 135 MMHG | DIASTOLIC BLOOD PRESSURE: 60 MMHG

## 2020-10-20 VITALS — HEART RATE: 73 BPM | DIASTOLIC BLOOD PRESSURE: 80 MMHG | SYSTOLIC BLOOD PRESSURE: 146 MMHG

## 2020-10-20 VITALS — SYSTOLIC BLOOD PRESSURE: 124 MMHG | HEART RATE: 59 BPM | TEMPERATURE: 98.6 F | DIASTOLIC BLOOD PRESSURE: 50 MMHG

## 2020-10-20 VITALS — SYSTOLIC BLOOD PRESSURE: 115 MMHG | HEART RATE: 78 BPM | DIASTOLIC BLOOD PRESSURE: 57 MMHG

## 2020-10-20 VITALS — OXYGEN SATURATION: 93 %

## 2020-10-20 VITALS — OXYGEN SATURATION: 78 %

## 2020-10-20 VITALS — HEART RATE: 59 BPM | DIASTOLIC BLOOD PRESSURE: 57 MMHG | OXYGEN SATURATION: 94 % | SYSTOLIC BLOOD PRESSURE: 110 MMHG

## 2020-10-20 VITALS — OXYGEN SATURATION: 95 %

## 2020-10-20 VITALS — SYSTOLIC BLOOD PRESSURE: 115 MMHG | HEART RATE: 52 BPM | TEMPERATURE: 97.8 F | DIASTOLIC BLOOD PRESSURE: 58 MMHG

## 2020-10-20 VITALS — SYSTOLIC BLOOD PRESSURE: 121 MMHG | HEART RATE: 73 BPM | DIASTOLIC BLOOD PRESSURE: 60 MMHG

## 2020-10-20 VITALS — OXYGEN SATURATION: 97 %

## 2020-10-20 VITALS — OXYGEN SATURATION: 92 %

## 2020-10-20 VITALS — TEMPERATURE: 98.6 F | SYSTOLIC BLOOD PRESSURE: 141 MMHG | DIASTOLIC BLOOD PRESSURE: 56 MMHG | HEART RATE: 59 BPM

## 2020-10-20 VITALS — OXYGEN SATURATION: 96 %

## 2020-10-20 VITALS — TEMPERATURE: 98.1 F | DIASTOLIC BLOOD PRESSURE: 38 MMHG | SYSTOLIC BLOOD PRESSURE: 122 MMHG | HEART RATE: 52 BPM

## 2020-10-20 VITALS — OXYGEN SATURATION: 91 %

## 2020-10-20 VITALS — OXYGEN SATURATION: 98 %

## 2020-10-20 VITALS — OXYGEN SATURATION: 90 %

## 2020-10-20 LAB
ANION GAP SERPL CALC-SCNC: 7 MMOL/L (ref 7–16)
BASOPHILS # BLD: 0 10*3/UL (ref 0–0.2)
BASOPHILS NFR BLD AUTO: 0.4 % (ref 0–2)
BUN SERPL-MCNC: 27 MG/DL (ref 9–20)
CALCIUM SERPL-MCNC: 8.3 MG/DL (ref 8.4–10.2)
CHLORIDE SERPL-SCNC: 103 MMOL/L (ref 98–107)
CO2 SERPL-SCNC: 25 MMOL/L (ref 22–30)
CREAT SERPL-SCNC: 0.97 UMOL/L (ref 0.66–1.25)
EOSINOPHIL # BLD: 0.2 10*3/UL (ref 0–0.7)
EOSINOPHIL NFR BLD: 1.9 % (ref 0–4)
ERYTHROCYTE [DISTWIDTH] IN BLOOD BY AUTOMATED COUNT: 12.3 % (ref 11.5–14.5)
GLUCOSE SERPL-MCNC: 100 MG/DL (ref 74–106)
GRANULOCYTES # BLD AUTO: 77.4 % (ref 42.2–75.2)
HCT VFR BLD AUTO: 33.8 % (ref 42–52)
HGB BLD-MCNC: 11.3 G/DL (ref 13.5–18)
INR BLD: 1.2 (ref 0.8–3)
LYMPHOCYTES # BLD: 0.7 10*3/UL (ref 1.2–3.4)
LYMPHOCYTES NFR BLD: 9.1 % (ref 20–51)
MCH RBC QN AUTO: 31 PG (ref 27–31)
MCHC RBC AUTO-ENTMCNC: 33 G/DL (ref 33–37)
MCV RBC AUTO: 92 FL (ref 80–100)
MONOCYTES # BLD: 0.8 10*3/UL (ref 0.1–0.6)
MONOCYTES NFR BLD AUTO: 10.6 % (ref 1.7–9.3)
NEUTROPHILS # BLD: 6.1 10*3/UL (ref 1.4–6.5)
PLATELET # BLD AUTO: 256 K/MM3 (ref 130–400)
PMV BLD AUTO: 10.2 FL (ref 7.4–10.4)
POTASSIUM SERPL-SCNC: 4.3 MMOL/L (ref 3.4–5)
PROTHROMBIN TIME: 13.3 SECONDS (ref 9.7–12.8)
RBC # BLD AUTO: 3.68 M/MM3 (ref 4.2–5.6)
SODIUM SERPL-SCNC: 135 MMOL/L (ref 137–145)

## 2020-10-20 PROCEDURE — 02H63JZ INSERTION OF PACEMAKER LEAD INTO RIGHT ATRIUM, PERCUTANEOUS APPROACH: ICD-10-PCS | Performed by: INTERNAL MEDICINE

## 2020-10-20 PROCEDURE — 02HK3JZ INSERTION OF PACEMAKER LEAD INTO RIGHT VENTRICLE, PERCUTANEOUS APPROACH: ICD-10-PCS | Performed by: INTERNAL MEDICINE

## 2020-10-20 PROCEDURE — 0JH606Z INSERTION OF PACEMAKER, DUAL CHAMBER INTO CHEST SUBCUTANEOUS TISSUE AND FASCIA, OPEN APPROACH: ICD-10-PCS | Performed by: INTERNAL MEDICINE

## 2020-12-17 ENCOUNTER — HOSPITAL ENCOUNTER (OUTPATIENT)
Dept: HOSPITAL 19 - COL.CAR | Age: 80
Discharge: HOME | End: 2020-12-17
Attending: INTERNAL MEDICINE
Payer: MEDICARE

## 2020-12-17 VITALS — TEMPERATURE: 98.1 F | SYSTOLIC BLOOD PRESSURE: 151 MMHG | HEART RATE: 68 BPM | DIASTOLIC BLOOD PRESSURE: 84 MMHG

## 2020-12-17 VITALS — BODY MASS INDEX: 24.62 KG/M2 | WEIGHT: 171.96 LBS | HEIGHT: 70 IN

## 2020-12-17 DIAGNOSIS — Z95.0: ICD-10-CM

## 2020-12-17 DIAGNOSIS — I25.10: ICD-10-CM

## 2020-12-17 DIAGNOSIS — I10: ICD-10-CM

## 2020-12-17 DIAGNOSIS — Z79.82: ICD-10-CM

## 2020-12-17 DIAGNOSIS — I48.0: Primary | ICD-10-CM

## 2020-12-17 LAB
INR BLD: 1.3 (ref 0.8–3)
POTASSIUM SERPL-SCNC: 4.9 MMOL/L (ref 3.4–5)
PROTHROMBIN TIME: 14.2 SECONDS (ref 9.7–12.8)
TSH SERPL DL<=0.005 MIU/L-ACNC: 2.99 UIU/ML (ref 0.47–4.68)

## 2020-12-17 NOTE — NUR
DR MELENDEZ HERE WITH [facility], PACEMAKER DEVICE USED BY [facility], PT IS IN
SINUS RHYTHM, PROCEDURE WAS CANCELLED BY DR MELENDEZ,

## 2020-12-17 NOTE — NUR
IV D'CD INTACT, PT IS UP IN ROOM DRESSED, NEW APPT CARD GIVEN TO PT FOR DEC.
23 AT 2:15, ALSO PRINT OUT OF MEDS THAT WAS UPDATED WHEN PT BROUGHT IN PILL
BOTTLES GIVEN, INSTRUCTED PT TO BRING MED LIST AND PILL BOTTLES TO APPT NEXT
WEEK. CALLED SON FOR RIDE, SPOKE WITH SON ON APPT AND PT TO BRING MEDS WITH
HIM TO APPT. PT DISCHARGED AMB. TO LOBBY TO WAIT FOR SON.